# Patient Record
Sex: FEMALE | Race: WHITE
[De-identification: names, ages, dates, MRNs, and addresses within clinical notes are randomized per-mention and may not be internally consistent; named-entity substitution may affect disease eponyms.]

---

## 2019-09-18 ENCOUNTER — HOSPITAL ENCOUNTER (EMERGENCY)
Dept: HOSPITAL 62 - ER | Age: 49
Discharge: HOME | End: 2019-09-18
Payer: COMMERCIAL

## 2019-09-18 VITALS — DIASTOLIC BLOOD PRESSURE: 88 MMHG | SYSTOLIC BLOOD PRESSURE: 123 MMHG

## 2019-09-18 DIAGNOSIS — Z98.51: ICD-10-CM

## 2019-09-18 DIAGNOSIS — R07.9: ICD-10-CM

## 2019-09-18 DIAGNOSIS — Z91.040: ICD-10-CM

## 2019-09-18 DIAGNOSIS — M94.0: Primary | ICD-10-CM

## 2019-09-18 LAB
ADD MANUAL DIFF: NO
ALBUMIN SERPL-MCNC: 4.6 G/DL (ref 3.5–5)
ALP SERPL-CCNC: 57 U/L (ref 38–126)
ANION GAP SERPL CALC-SCNC: 10 MMOL/L (ref 5–19)
AST SERPL-CCNC: 23 U/L (ref 14–36)
BASOPHILS # BLD AUTO: 0.1 10^3/UL (ref 0–0.2)
BASOPHILS NFR BLD AUTO: 1 % (ref 0–2)
BILIRUB DIRECT SERPL-MCNC: 0.1 MG/DL (ref 0–0.4)
BILIRUB SERPL-MCNC: 0.7 MG/DL (ref 0.2–1.3)
BUN SERPL-MCNC: 11 MG/DL (ref 7–20)
CALCIUM: 9.8 MG/DL (ref 8.4–10.2)
CHLORIDE SERPL-SCNC: 102 MMOL/L (ref 98–107)
CO2 SERPL-SCNC: 28 MMOL/L (ref 22–30)
EOSINOPHIL # BLD AUTO: 0.2 10^3/UL (ref 0–0.6)
EOSINOPHIL NFR BLD AUTO: 2.2 % (ref 0–6)
ERYTHROCYTE [DISTWIDTH] IN BLOOD BY AUTOMATED COUNT: 14.8 % (ref 11.5–14)
GLUCOSE SERPL-MCNC: 98 MG/DL (ref 75–110)
HCT VFR BLD CALC: 36.9 % (ref 36–47)
HGB BLD-MCNC: 12.5 G/DL (ref 12–15.5)
LYMPHOCYTES # BLD AUTO: 2.7 10^3/UL (ref 0.5–4.7)
LYMPHOCYTES NFR BLD AUTO: 31.7 % (ref 13–45)
MCH RBC QN AUTO: 28.8 PG (ref 27–33.4)
MCHC RBC AUTO-ENTMCNC: 33.9 G/DL (ref 32–36)
MCV RBC AUTO: 85 FL (ref 80–97)
MONOCYTES # BLD AUTO: 0.6 10^3/UL (ref 0.1–1.4)
MONOCYTES NFR BLD AUTO: 7.2 % (ref 3–13)
NEUTROPHILS # BLD AUTO: 4.8 10^3/UL (ref 1.7–8.2)
NEUTS SEG NFR BLD AUTO: 57.9 % (ref 42–78)
PLATELET # BLD: 310 10^3/UL (ref 150–450)
POTASSIUM SERPL-SCNC: 3.7 MMOL/L (ref 3.6–5)
PROT SERPL-MCNC: 7.4 G/DL (ref 6.3–8.2)
RBC # BLD AUTO: 4.35 10^6/UL (ref 3.72–5.28)
TOTAL CELLS COUNTED % (AUTO): 100 %
WBC # BLD AUTO: 8.4 10^3/UL (ref 4–10.5)

## 2019-09-18 PROCEDURE — 93010 ELECTROCARDIOGRAM REPORT: CPT

## 2019-09-18 PROCEDURE — 93005 ELECTROCARDIOGRAM TRACING: CPT

## 2019-09-18 PROCEDURE — 85379 FIBRIN DEGRADATION QUANT: CPT

## 2019-09-18 PROCEDURE — 71046 X-RAY EXAM CHEST 2 VIEWS: CPT

## 2019-09-18 PROCEDURE — 84484 ASSAY OF TROPONIN QUANT: CPT

## 2019-09-18 PROCEDURE — 85025 COMPLETE CBC W/AUTO DIFF WBC: CPT

## 2019-09-18 PROCEDURE — 36415 COLL VENOUS BLD VENIPUNCTURE: CPT

## 2019-09-18 PROCEDURE — 80053 COMPREHEN METABOLIC PANEL: CPT

## 2019-09-18 NOTE — ER DOCUMENT REPORT
ED Cardiac





- General


Chief Complaint: Chest Pain


Stated Complaint: CHEST PAIN


Time Seen by Provider: 19 20:25


Primary Care Provider: 


SHANNON LANGSTON NP [Primary Care Provider] - Follow up as needed


Mode of Arrival: Ambulatory


Information source: Patient, Relative


Notes: 





HISTORY OF PRESENT ILLNESS:





Patient is a 49-year-old female with no significant past medical history who 

presents with intermittent episodes of sharp and aching chest pain that began 3 

weeks ago after a trip from [place] to [place] and been back home.  Patient denies 

having similar symptoms in the past.





Location: Sternal chest


Onset: 3 weeks ago


Alleviation: None


Provocation: Unknown


Quality: Sharp, aching


Radiation: None


Severity: Moderate at worst, currently resolved


Timing: Intermittent


History of CAD: None


Associated symptoms: Denies fevers or chills, no cough or congestion, no 

shortness of breath, no leg swelling











REVIEW OF SYSTEMS:





CONSTITUTIONAL : Denies fever or chills, no sweats.  Denies recent illness.


EENT:   Denies eye, ear, throat, or mouth pain or symptoms.  Denies nasal or 

sinus congestion.


CARDIOVASCULAR: Positive for chest pain.  Denies swelling of the legs.


RESPIRATORY: Denies cough, cold, or chest congestion.  Denies shortness of 

breath or difficulty breathing.  Denies wheezing.


GASTROINTESTINAL: Denies abdominal pain.  Denies nausea, vomiting, or diarrhea. 

Denies constipation. 


GENITOURINARY:  Denies difficulty urinating, painful urination, burning, 

frequency, or blood in urine.


MUSCULOSKELETAL:  Denies neck or back pain or joint pain or swelling.


SKIN:   Denies rash or skin lesions.


HEMATOLOGIC :   Denies easy bruising or bleeding.


LYMPHATIC:  Denies swollen, enlarged glands.


NEUROLOGICAL:  Denies altered mental status or loss of consciousness.  Denies 

headache.  Denies weakness or paralysis or loss of use of either side.  Denies 

problems with gait or speech.  Denies sensory or motor loss.


PSYCHIATRIC:  Denies anxiety or stress or depression.





All other systems reviewed and negative.











PHYSICAL EXAMINATION:





GENERAL: Well-appearing, well-nourished and in no acute distress.


HEAD: Atraumatic, normocephalic. No scalp deformity, depression, or crepitance.


EYES: Pupils are 3 mm and equal/round/reactive to light, extraocular movements 

intact, sclera anicteric, conjunctiva are normal.


ENT: Nares patent bilaterally, oropharynx.  Moist mucous membranes. No tonsil 

hypertrophy.


NECK: Normal range of motion, supple without lymphadenopathy.


LUNGS: Breath sounds present, equal, and clear to auscultation bilaterally.  No 

wheezes, rales, or rhonchi.


HEART: Regular rate and rhythm without murmurs, rubs, or gallops.  2+ peripheral

pulses.  Normal capillary refill.


ABDOMEN: Soft, nontender, nondistended. Normoactive bowel sounds.  No guarding, 

no rebound.  No masses appreciated.


BACK: Normal contour, no midline tenderness. Rectal exam deferred.


GENITAL/PELVIC: Deferred.


EXTREMITIES: Normal range of motion, no pitting or edema.  No cyanosis.


NEUROLOGICAL: No focal neurological deficits. Moves all extremities 

spontaneously and on command.


PSYCH: Normal mood, normal affect.  No suicidal thoughts/ideations.  No 

homicidal thoughts/ideations.  No hallucinations.


SKIN: Warm, dry, normal turgor, no rashes or lesions noted.











ASSESSMENT AND PLAN:





This patient is a 49-year-old female who presents with chest pain of unknown 

etiology, sounds more similar to pleurisy versus costochondritis versus 

noncardiac.  Pulmonary embolism is also possibility.





1. Will obtain labs, urine, 2 sets of cardiac enzymes, d-dimer, chest x-ray, and

reassess.


2. Will give aspirin.


TRAVEL OUTSIDE OF THE [place] IN LAST 30 DAYS: Yes


COUNTRY TRAVELED TO/FROM: [place]





- [facility]


Patient complains to provider of: Chest pain


Was the onset of pain: Sudden


Is the pain a: New problem


Chest pain location: Pleuritic


Quality of pain: Intermittent, Sharp


Chest pain radiation location: None


Severity now: None


Severity at worst: Moderate


Pain level currently: Denies


Chest pain precipitating factors: At Rest


Cardiac risk factors: None


Positive cardiac history: No


Associated symptoms: None


Exacerbated by: Emotional stress, Torso movement


Relieved by: Nothing


Similar symptoms previously: No


Recently seen / treated by doctor: No





- Related Data


Allergies/Adverse Reactions: 


                                        





latex [Latex] Allergy (Verified 16 19:20)


   











Past Medical History





- General


Information source: Patient, Relative





- Social History


Smoking Status: Never Smoker


Chew tobacco use (# tins/day): No


Frequency of alcohol use: None


Drug Abuse: None


Lives with: Family


Family History: Reviewed & Not Pertinent


Patient has suicidal ideation: No


Patient has homicidal ideation: No





- Past Medical History


Cardiac Medical History: Reports: Hx Heart Murmur


   Denies: Hx Atrial Fibrillation, Hx Congestive Heart Failure, Hx Coronary 

Artery Disease, Hx Heart Attack, Hx Hypercholesterolemia, Hx Hypertension, Hx 

Peripheral Vascular Disease


Pulmonary Medical History: Reports: None


EENT Medical History: Reports: None


Neurological Medical History: Reports: None


Endocrine Medical History: Reports: None


Renal/ Medical History: Reports: None


Malignancy Medical History: Reports: None


GI Medical History: Reports: None


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 

Fibromyalgia, Denies Hx Muscular Dystrophy


Skin Medical History: Reports None


Psychiatric Medical History: Reports: None


   Denies: Hx Depression


Traumatic Medical History: Reports: None.  Denies: Hx Fractures


Infectious Medical History: Reports: None


Past Surgical History: Reports: Hx Breast Surgery - lump removed bilaterally, Hx

 Tubal Ligation.  Denies: Hx Appendectomy, Hx Bowel Surgery, Hx  

Section, Hx Cholecystectomy, Hx Coronary Artery Bypass Graft, Hx Gastric Bypass 

Surgery, Hx Herniorrhaphy, Hx Hysterectomy, Hx Mastectomy, Hx Pacemaker, Hx 

Tonsillectomy





- Immunizations


Hx Diphtheria, Pertussis, Tetanus Vaccination: Yes





Review of Systems





- Review of Systems


Constitutional: No symptoms reported


EENT: No symptoms reported


Cardiovascular: See HPI, Chest pain


Respiratory: No symptoms reported


Gastrointestinal: No symptoms reported


Genitourinary: No symptoms reported


Female Genitourinary: No symptoms reported


Musculoskeletal: No symptoms reported


Skin: No symptoms reported


Hematologic/Lymphatic: No symptoms reported


Neurological/Psychological: No symptoms reported


-: Yes All other systems reviewed and negative





Physical Exam





- Vital signs


Vitals: 


                                        











Temp Pulse Resp BP Pulse Ox


 


 97.6 F   77   18   121/67   99 


 


 19 17:15  19 17:15  19 17:15  19 17:15  19 17:15











Interpretation: Normal





Course





- Re-evaluation


Re-evalutation: 





19 23:18Two sets of cardiac enzymes are negative, d-dimer is also 

negative.  Will discharge the patient home with strict return precautions and 

follow-up with primary care. All results were explained to and discussed with 

the patient, and all questions addressed and answered. The patient and her 

 voice both understanding and agreeing with the plan.





- Vital Signs


Vital signs: 


                                        











Temp Pulse Resp BP Pulse Ox


 


 98.7 F   66   24 H  123/88 H  99 


 


 19 23:25  19 23:25  19 23:25  19 23:25  19 23:25














- Laboratory


Result Diagrams: 


                                 19 18:09





                                 19 18:09


Laboratory results interpreted by me: 


                                        











  19





  18:09


 


RDW  14.8 H














- Diagnostic Test


Radiology reviewed: Image reviewed, Reports reviewed





- EKG Interpretation by Me


EKG shows normal: Sinus rhythm


Rate: Normal


Rhythm: NSR


Axis/QRS: No: Right axis deviation, Left axis deviation, RBBB, LBBB, IVCD, 

LAHB/LAFB, LPHB/LPFB, Bifasicular block


Voltage: No: Increased voltage, Consistant with LVH, Decreased voltage, 

Throughout, Limb leads


P Waves: No: YOLY, LAE, Absent, AV Dissociation, Other


Heart block present: No: 1st Degree, Mobitz 1, Mobitz 2, CHB (3rd degree block)


When compared to previous EKG there are: No significant change





Discharge





- Discharge


Clinical Impression: 


 Acute costochondritis





Condition: Good


Disposition: HOME, SELF-CARE


Instructions:  Chest Wall Pain ([facility])


Additional Instructions: 


You have been evaluated in the Emergency Department for chest pain that is most 

likely from swelling or inflammation in the chest wall.  While here, you had c

omplete blood work that was normal and it is now safe to be discharged home.  

Please follow-up with your primary physician as instructed in one week to be 

rechecked. Return to the Emergency Department if you experience worsening pain, 

difficulty breathing, or any other concerning symptoms.


Prescriptions: 


Diclofenac Sodium 75 mg PO BID #30 tablet.


Referrals: 


SHANNON LANGSTON, NP [Primary Care Provider] - Follow up as needed


Print Language: English

## 2019-09-18 NOTE — ER DOCUMENT REPORT
ED Medical Screen (RME)





- General


Chief Complaint: Chest Pain


Stated Complaint: CHEST PAIN


Time Seen by Provider: 19 17:42


TRAVEL OUTSIDE OF THE [place] IN LAST 30 DAYS: Yes


COUNTRY TRAVELED TO/FROM: [place]





- [facility]


Notes: 





19 18:03


49-year-old female to the emergency department with complaints of midsternal 

chest pain with associated dizziness and shortness of breath has been ongoing 

since .  She states that the episodes come and go.  She states that the 

chest pain lasts for several seconds but then the shortness of breath and 

dizziness last longer.  She states that she noticed this is more with exertion 

such as walking around the mall.  She does admit to bilateral leg swelling which

is new for her.  She states that she recently has been traveling to [place] and 

had [place] via airplane.  She is not on any external hormone therapy.  She is 

never had a clot in her legs.  She is not a smoker.  She is on any medicines so 

she does not believe that she has diabetes, hypertension, hyperlipidemia.  There

is family history of her father having a heart attack at age 61.  








I performed a brief medical screening exam on patient and have placed initial 

orders to expedite her care.  We will give patient 325 of aspirin and obtain 

labs.  We will have her further evaluated and managed by main side provider.





- Related Data


Allergies/Adverse Reactions: 


                                        





latex [Latex] Allergy (Verified 16 19:20)


   











Past Medical History





- Past Medical History


Cardiac Medical History: Reports: Hx Heart Murmur


   Denies: Hx Atrial Fibrillation, Hx Congestive Heart Failure, Hx Coronary 

Artery Disease, Hx Heart Attack, Hx Hypercholesterolemia, Hx Hypertension, Hx 

Peripheral Vascular Disease


Musculoskeltal Medical History: Reports Hx Arthritis - wrist, Denies Hx 

Fibromyalgia, Denies Hx Muscular Dystrophy


Psychiatric Medical History: 


   Denies: Hx Depression


Traumatic Medical History: Denies: Hx Fractures


Past Surgical History: Reports: Hx Breast Surgery - lump removed bilaterally, Hx

 Tubal Ligation.  Denies: Hx Appendectomy, Hx Bowel Surgery, Hx  

Section, Hx Cholecystectomy, Hx Coronary Artery Bypass Graft, Hx Gastric Bypass 

Surgery, Hx Herniorrhaphy, Hx Hysterectomy, Hx Mastectomy, Hx Pacemaker, Hx 

Tonsillectomy





- Immunizations


Hx Diphtheria, Pertussis, Tetanus Vaccination: Yes





Physical Exam





- Vital signs


Vitals: 





                                        











Temp Pulse Resp BP Pulse Ox


 


 97.6 F   77   18   121/67   99 


 


 19 17:15  19 17:15  19 17:15  19 17:15  19 17:15














Course





- Vital Signs


Vital signs: 





                                        











Temp Pulse Resp BP Pulse Ox


 


 97.6 F   77   18   121/67   99 


 


 19 17:15  19 17:15  19 17:15  19 17:15  19 17:15

## 2019-09-18 NOTE — RADIOLOGY REPORT (SQ)
EXAM DESCRIPTION:  CHEST 2 VIEWS



COMPLETED DATE/TIME:  9/18/2019 6:48 pm



REASON FOR STUDY:  chest pain



COMPARISON:  None.



EXAM PARAMETERS:  NUMBER OF VIEWS: two views

TECHNIQUE: Digital Frontal and Lateral radiographic views of the chest acquired.

RADIATION DOSE: NA

LIMITATIONS: none



FINDINGS:  LUNGS AND PLEURA: No opacities, masses or pneumothorax. No pleural effusion.

MEDIASTINUM AND HILAR STRUCTURES: No masses or contour abnormalities.

HEART AND VASCULAR STRUCTURES: Heart normal size.  No evidence for failure.

BONES: No acute findings.

HARDWARE: None in the chest.

OTHER: No other significant finding.



IMPRESSION:  NO ACUTE RADIOGRAPHIC FINDING IN THE CHEST.



TECHNICAL DOCUMENTATION:  JOB ID:  [id number]

 2011 [employer]- All Rights Reserved



Reading location - IP/workstation name: [email or web address]